# Patient Record
Sex: FEMALE | HISPANIC OR LATINO
[De-identification: names, ages, dates, MRNs, and addresses within clinical notes are randomized per-mention and may not be internally consistent; named-entity substitution may affect disease eponyms.]

---

## 2017-07-14 PROBLEM — Z00.00 ENCOUNTER FOR PREVENTIVE HEALTH EXAMINATION: Status: ACTIVE | Noted: 2017-07-14

## 2017-07-17 ENCOUNTER — APPOINTMENT (OUTPATIENT)
Dept: VASCULAR SURGERY | Facility: CLINIC | Age: 67
End: 2017-07-17

## 2017-07-17 VITALS — OXYGEN SATURATION: 99 % | SYSTOLIC BLOOD PRESSURE: 137 MMHG | HEART RATE: 66 BPM | DIASTOLIC BLOOD PRESSURE: 84 MMHG

## 2017-07-17 DIAGNOSIS — Z86.39 PERSONAL HISTORY OF OTHER ENDOCRINE, NUTRITIONAL AND METABOLIC DISEASE: ICD-10-CM

## 2017-07-17 DIAGNOSIS — Z78.9 OTHER SPECIFIED HEALTH STATUS: ICD-10-CM

## 2017-07-17 DIAGNOSIS — Z86.79 PERSONAL HISTORY OF OTHER DISEASES OF THE CIRCULATORY SYSTEM: ICD-10-CM

## 2017-07-17 DIAGNOSIS — R10.31 RIGHT LOWER QUADRANT PAIN: ICD-10-CM

## 2017-07-18 PROBLEM — Z86.79 HISTORY OF HYPERTENSION: Status: RESOLVED | Noted: 2017-07-18 | Resolved: 2017-07-18

## 2017-07-18 PROBLEM — Z78.9 NON-SMOKER: Status: ACTIVE | Noted: 2017-07-18

## 2017-07-18 PROBLEM — Z86.39 HISTORY OF DIABETES MELLITUS: Status: RESOLVED | Noted: 2017-07-18 | Resolved: 2017-07-18

## 2017-07-18 PROBLEM — R10.31 GROIN PAIN, RIGHT: Status: ACTIVE | Noted: 2017-07-18

## 2023-12-06 ENCOUNTER — APPOINTMENT (OUTPATIENT)
Dept: ENDOCRINOLOGY | Facility: CLINIC | Age: 73
End: 2023-12-06
Payer: MEDICARE

## 2023-12-06 VITALS
HEART RATE: 68 BPM | WEIGHT: 128 LBS | SYSTOLIC BLOOD PRESSURE: 141 MMHG | BODY MASS INDEX: 25.13 KG/M2 | DIASTOLIC BLOOD PRESSURE: 79 MMHG | HEIGHT: 60 IN

## 2023-12-06 PROCEDURE — 99205 OFFICE O/P NEW HI 60 MIN: CPT | Mod: 25

## 2023-12-06 PROCEDURE — 82962 GLUCOSE BLOOD TEST: CPT

## 2023-12-06 RX ORDER — INSULIN ASPART 100 [IU]/ML
100 INJECTION, SOLUTION INTRAVENOUS; SUBCUTANEOUS
Qty: 1 | Refills: 0 | Status: ACTIVE | COMMUNITY
Start: 2023-12-06 | End: 1900-01-01

## 2023-12-08 LAB
ALBUMIN SERPL ELPH-MCNC: 4.4 G/DL
ALP BLD-CCNC: 71 U/L
ALT SERPL-CCNC: 23 U/L
ANION GAP SERPL CALC-SCNC: 12 MMOL/L
AST SERPL-CCNC: 24 U/L
BILIRUB SERPL-MCNC: 0.3 MG/DL
BUN SERPL-MCNC: 20 MG/DL
CALCIUM SERPL-MCNC: 9.9 MG/DL
CHLORIDE SERPL-SCNC: 102 MMOL/L
CHOLEST SERPL-MCNC: 101 MG/DL
CO2 SERPL-SCNC: 28 MMOL/L
CREAT SERPL-MCNC: 1.02 MG/DL
CREAT SPEC-SCNC: 35 MG/DL
EGFR: 58 ML/MIN/1.73M2
ESTIMATED AVERAGE GLUCOSE: 206 MG/DL
GLUCOSE BLDC GLUCOMTR-MCNC: 102
GLUCOSE BLDC GLUCOMTR-MCNC: 63
GLUCOSE BLDC GLUCOMTR-MCNC: 92
HBA1C MFR BLD HPLC: 8.8 %
HDLC SERPL-MCNC: 44 MG/DL
LDLC SERPL CALC-MCNC: 44 MG/DL
MICROALBUMIN 24H UR DL<=1MG/L-MCNC: <1.2 MG/DL
MICROALBUMIN/CREAT 24H UR-RTO: NORMAL MG/G
NONHDLC SERPL-MCNC: 57 MG/DL
POTASSIUM SERPL-SCNC: 4.5 MMOL/L
PROT SERPL-MCNC: 7.8 G/DL
SODIUM SERPL-SCNC: 142 MMOL/L
TRIGL SERPL-MCNC: 57 MG/DL

## 2023-12-26 ENCOUNTER — APPOINTMENT (OUTPATIENT)
Dept: ENDOCRINOLOGY | Facility: CLINIC | Age: 73
End: 2023-12-26
Payer: MEDICARE

## 2023-12-26 VITALS
DIASTOLIC BLOOD PRESSURE: 71 MMHG | SYSTOLIC BLOOD PRESSURE: 125 MMHG | BODY MASS INDEX: 25.39 KG/M2 | WEIGHT: 130 LBS | HEART RATE: 79 BPM

## 2023-12-26 LAB — GLUCOSE BLDC GLUCOMTR-MCNC: 79

## 2023-12-26 PROCEDURE — 82962 GLUCOSE BLOOD TEST: CPT

## 2023-12-26 PROCEDURE — 99214 OFFICE O/P EST MOD 30 MIN: CPT | Mod: 25

## 2023-12-26 NOTE — REVIEW OF SYSTEMS
[Recent Weight Loss (___ Lbs)] : recent weight loss: [unfilled] lbs [Nocturia] : nocturia [As Noted in HPI] : as noted in HPI [Joint Pain] : joint pain [Negative] : Heme/Lymph [FreeTextEntry9] : endorses LE pains

## 2023-12-26 NOTE — DATA REVIEWED
[FreeTextEntry1] : Scanned Labs/Labs Brought by Pt:  - 10/25/2023: Calcium 9.2, Creatine 1.04, LDL-c 36, vit d 33, free t4 1.2, free t3 2.73, TSH 1.25, HGB 13.5, HCT 42.4, A1c 8.8%,

## 2023-12-26 NOTE — PHYSICAL EXAM
[No Acute Distress] : no acute distress [Normal Sclera/Conjunctiva] : normal sclera/conjunctiva [Normal Outer Ear/Nose] : the ears and nose were normal in appearance [Thyroid Not Enlarged] : the thyroid was not enlarged [No Thyroid Nodules] : no palpable thyroid nodules [Clear to Auscultation] : lungs were clear to auscultation bilaterally [Normal Rate] : heart rate was normal [No Edema] : no peripheral edema [Pedal Pulses Normal] : the pedal pulses are present [Soft] : abdomen soft [Spine Straight] : spine straight [No Stigmata of Cushings Syndrome] : no stigmata of Cushings Syndrome [Normal Gait] : normal gait [Normal Strength/Tone] : muscle strength and tone were normal [No Rash] : no rash [Normal Reflexes] : deep tendon reflexes were 2+ and symmetric [Oriented x3] : oriented to person, place, and time [Acanthosis Nigricans] : no acanthosis nigricans

## 2023-12-26 NOTE — ADDENDUM
[FreeTextEntry1] : I, Maddie John, acted solely as a scribe for Dr. Shen Molina on this date 12/26/2023

## 2023-12-26 NOTE — HISTORY OF PRESENT ILLNESS
[FreeTextEntry1] : 73-year-old F pt, with Hx of T2DM dx ~2003, self-referred, presents today to establish endocrine care. DM related complications: proliferative neuropathy Other PMHx: HTN, CAD (cardiac stent 2010), DVT Social Hx: Nonsmoker. No EtOH use.  NKDA Recent Hospitalization in 2-3 years: No   12/06/2023 Patient presents today for T2DM dx ~2003.   Patient has POCT 92, /79 and BMI 25. CC: "I am here because my BS is too high". Pt is accompanied by family member.  She states she has been dx for about 20 years. Pt endorses feeling well overall but endorses LE and joint pains. As per pt's daughter, the pt's BS averages about ~300 and sometimes is found to have BS in the 60s. Pt endorses weight loss, nocturia. She reports she was prescribed Rybelsus but only took in for 3 weeks (about 2 months ago).  Review of Chart:  - 07/17/23: Recently saw by vascular surgeon for R groin pain.   12/26/2023 Patient has POCT BS 79 (Breakfast 8 am), /71 and BMI 25.93. CC: "I'm not feeling well. I have diabetes, that bothers me." Pt reports her BS fluctuates and had symptoms of hypoglycemia yesterday at 8 pm with dinner at 7 pm. She said her symptoms subsided after eating carbohydrates.   Patient FBS: 173, 126, 150, 139, 145, 109, 125.    [Medications verified on 12/26/2023 as per pt]  Current Medications: Basaglar 25 u qpm,  Novolog 4 u ac, Atorvastatin 40 mg qd, Zetia 10 mg qd, Gabapentin 100 mg tid, Memantine 5 mg, ASA 81 mg qd, Carvedilol 25 mg bid, Amlodipine 5 mg qd, Losartan-thiazine 100-25 mg qd, Famotidine -- HELD: Rybelsus 3 mg (self-dced after 3 weeks; ended in 10/2023),  Metformin 1000 mg bid, Glipizide 5 mg bid,

## 2023-12-26 NOTE — ASSESSMENT
[Carbohydrate Consistent Diet] : carbohydrate consistent diet [Importance of Diet and Exercise] : importance of diet and exercise to improve glycemic control, achieve weight loss and improve cardiovascular health [Action and use of Insulin] : action and use of short and long-acting insulin [Self Monitoring of Blood Glucose] : self monitoring of blood glucose [Diabetic Medications] : Risks and benefits of diabetic medications were discussed

## 2023-12-26 NOTE — END OF VISIT
[FreeTextEntry3] : All medical record entries made by the Scribe were at my, Dr. Shen Molina, direction and personally dictated by me on 12/26/2023. I have reviewed the chart and agree that the record accurately reflects my personal performance of the history, physical exam, assessment and plan. I have also personally, directed, reviewed and agreed with the chart  [Time Spent: ___ minutes] : I have spent [unfilled] minutes of time on the encounter.

## 2024-02-08 ENCOUNTER — APPOINTMENT (OUTPATIENT)
Dept: ENDOCRINOLOGY | Facility: CLINIC | Age: 74
End: 2024-02-08
Payer: MEDICARE

## 2024-02-08 VITALS
HEART RATE: 72 BPM | DIASTOLIC BLOOD PRESSURE: 85 MMHG | SYSTOLIC BLOOD PRESSURE: 143 MMHG | WEIGHT: 130 LBS | BODY MASS INDEX: 25.39 KG/M2

## 2024-02-08 PROCEDURE — 99214 OFFICE O/P EST MOD 30 MIN: CPT | Mod: 25

## 2024-02-08 PROCEDURE — 82962 GLUCOSE BLOOD TEST: CPT

## 2024-02-08 NOTE — HISTORY OF PRESENT ILLNESS
[FreeTextEntry1] : 73-year-old F pt, with Hx of T2DM dx ~2003, self-referred, presents today to establish endocrine care. DM related complications: proliferative neuropathy Other PMHx: HTN, CAD (cardiac stent 2010), DVT Social Hx: Nonsmoker. No EtOH use.  NKDA Recent Hospitalization in 2-3 years: No   12/06/2023 Patient presents today for T2DM dx ~2003.   Patient has POCT 92, /79 and BMI 25. CC: "I am here because my BS is too high". Pt is accompanied by family member.  She states she has been dx for about 20 years. Pt endorses feeling well overall but endorses LE and joint pains. As per pt's daughter, the pt's BS averages about ~300 and sometimes is found to have BS in the 60s. Pt endorses weight loss, nocturia. She reports she was prescribed Rybelsus but only took in for 3 weeks (about 2 months ago).  Review of Chart:  - 07/17/23: Recently saw by vascular surgeon for R groin pain.   12/26/2023 Patient has POCT BS 79 (Breakfast 8 am), /71 and BMI 25.93. CC: "I'm not feeling well. I have diabetes, that bothers me." Pt reports her BS fluctuates and had symptoms of hypoglycemia yesterday at 8 pm with dinner at 7 pm. She said her symptoms subsided after eating carbohydrates.   Patient FBS: 173, 126, 150, 139, 145, 109, 125.   02/08/2024  Pt has POCT 110, /85 and BMI 25.39. No significant weight change. Pt is accompanied by her daughter. CC: "I'm feeling better.  Pt requires medical clearance for a scheduled procedure (hysterectomy) at [facility].  She brought in written records reporting: FBS: 125, 120, 127, 131, 97, 133, 121, 133. 7 days average: 204  PPBS: 243, 254, 324,   [Medications verified as per pt on 02/08/2024] Current Medications: Basaglar 20 u qpm,  Novolog 7 u ac (Increased from 4 u ac), Atorvastatin 40 mg qd, Zetia 10 mg qd, Gabapentin 100 mg tid, Memantine 5 mg, ASA 81 mg qd, Carvedilol 25 mg bid, Amlodipine 5 mg qd, Losartan-thiazine 100-25 mg qd, Famotidine -- HELD: Rybelsus 3 mg (self-dced after 3 weeks; ended in 10/2023), Metformin 1000 mg bid, Glipizide 5 mg bid Medication modified/added this visit:

## 2024-02-08 NOTE — END OF VISIT
[FreeTextEntry3] :  All medical record entries made by the Scribe were at my, Dr. Shen Molina, direction and personally dictated by me on 02/08/2024. I have reviewed the chart and agree that the record accurately reflects my personal performance of the history, physical exam, assessment and plan. I have also personally directed, reviewed and agreed with the chart. [Time Spent: ___ minutes] : I have spent [unfilled] minutes of time on the encounter.

## 2024-02-09 LAB — GLUCOSE BLDC GLUCOMTR-MCNC: 110

## 2024-03-14 ENCOUNTER — APPOINTMENT (OUTPATIENT)
Dept: ENDOCRINOLOGY | Facility: CLINIC | Age: 74
End: 2024-03-14
Payer: MEDICARE

## 2024-03-14 VITALS
HEART RATE: 70 BPM | DIASTOLIC BLOOD PRESSURE: 75 MMHG | SYSTOLIC BLOOD PRESSURE: 133 MMHG | WEIGHT: 129 LBS | BODY MASS INDEX: 25.19 KG/M2

## 2024-03-14 LAB
ANION GAP SERPL CALC-SCNC: 7 MMOL/L
BUN SERPL-MCNC: 23 MG/DL
CALCIUM SERPL-MCNC: 10.1 MG/DL
CHLORIDE SERPL-SCNC: 102 MMOL/L
CO2 SERPL-SCNC: 31 MMOL/L
CREAT SERPL-MCNC: 1.19 MG/DL
EGFR: 48 ML/MIN/1.73M2
ESTIMATED AVERAGE GLUCOSE: 217 MG/DL
GLUCOSE BLDC GLUCOMTR-MCNC: 115
GLUCOSE SERPL-MCNC: 123 MG/DL
HBA1C MFR BLD HPLC: 9.2 %
HBA1C MFR BLD HPLC: 9.4
POTASSIUM SERPL-SCNC: 4.9 MMOL/L
SODIUM SERPL-SCNC: 141 MMOL/L

## 2024-03-14 PROCEDURE — 83036 HEMOGLOBIN GLYCOSYLATED A1C: CPT | Mod: QW

## 2024-03-14 PROCEDURE — 82962 GLUCOSE BLOOD TEST: CPT

## 2024-03-14 PROCEDURE — 99213 OFFICE O/P EST LOW 20 MIN: CPT | Mod: 25

## 2024-03-14 NOTE — ASSESSMENT
[Importance of Diet and Exercise] : importance of diet and exercise to improve glycemic control, achieve weight loss and improve cardiovascular health [Carbohydrate Consistent Diet] : carbohydrate consistent diet [Self Monitoring of Blood Glucose] : self monitoring of blood glucose [Action and use of Insulin] : action and use of short and long-acting insulin [Diabetic Medications] : Risks and benefits of diabetic medications were discussed

## 2024-03-15 NOTE — END OF VISIT
[Time Spent: ___ minutes] : I have spent [unfilled] minutes of time on the encounter. [FreeTextEntry3] :  All medical record entries made by the Scribe were at my, Dr. Shen Molina, direction and personally dictated by me on 03/14/2024. I have reviewed the chart and agree that the record accurately reflects my personal performance of the history, physical exam, assessment and plan. I have also personally directed, reviewed and agreed with the chart.

## 2024-04-12 DIAGNOSIS — E11.65 TYPE 2 DIABETES MELLITUS WITH HYPERGLYCEMIA: ICD-10-CM

## 2024-07-12 ENCOUNTER — APPOINTMENT (OUTPATIENT)
Dept: ENDOCRINOLOGY | Facility: CLINIC | Age: 74
End: 2024-07-12